# Patient Record
Sex: MALE | Race: WHITE
[De-identification: names, ages, dates, MRNs, and addresses within clinical notes are randomized per-mention and may not be internally consistent; named-entity substitution may affect disease eponyms.]

---

## 2018-08-18 ENCOUNTER — HOSPITAL ENCOUNTER (EMERGENCY)
Dept: HOSPITAL 47 - EC | Age: 33
Discharge: HOME | End: 2018-08-18
Payer: COMMERCIAL

## 2018-08-18 VITALS — TEMPERATURE: 98.4 F | SYSTOLIC BLOOD PRESSURE: 122 MMHG | DIASTOLIC BLOOD PRESSURE: 60 MMHG | RESPIRATION RATE: 18 BRPM

## 2018-08-18 VITALS — HEART RATE: 98 BPM

## 2018-08-18 DIAGNOSIS — F17.200: ICD-10-CM

## 2018-08-18 DIAGNOSIS — L02.414: Primary | ICD-10-CM

## 2018-08-18 PROCEDURE — 99283 EMERGENCY DEPT VISIT LOW MDM: CPT

## 2018-08-18 PROCEDURE — 87070 CULTURE OTHR SPECIMN AEROBIC: CPT

## 2018-08-18 PROCEDURE — 87077 CULTURE AEROBIC IDENTIFY: CPT

## 2018-08-18 PROCEDURE — 87205 SMEAR GRAM STAIN: CPT

## 2018-08-18 PROCEDURE — 10060 I&D ABSCESS SIMPLE/SINGLE: CPT

## 2018-08-18 PROCEDURE — 87186 SC STD MICRODIL/AGAR DIL: CPT

## 2018-08-18 NOTE — ED
General Adult HPI





- General


Chief complaint: Skin/Abscess/Foreign Body


Stated complaint: Arm abscess


Time Seen by Provider: 08/18/18 19:55


Source: patient, RN notes reviewed


Mode of arrival: ambulatory


Limitations: no limitations





- History of Present Illness


Initial comments: 





33-year-old male presents to the emergency determine for a chief complaint of 

abscess on the left upper arm.  Patient states this started 5 days ago.  

Patient states it has gotten better.  He states he thought it would go away so 

did not come until today when he realized it would not go away on its own.  

Patient denies any fevers or chills.  Patient has not tried warm compresses.  

Patient states today it did start draining somewhat.  Patient denies having any 

abscesses previously.  Patient denies any history of MRSA. Patient has no other 

complaints at this time including shortness of breath, chest pain, abdominal 

pain, nausea or vomiting, headache, or visual changes.





- Related Data


 Previous Rx's











 Medication  Instructions  Recorded


 


Cephalexin [Keflex] 500 mg PO Q6H 10 Days  cap 08/18/18


 


Sulfamethox-Tmp 800-160Mg [Bactrim 1 tab PO Q12HR #20 tab 08/18/18





-160 mg]  











 Allergies











Allergy/AdvReac Type Severity Reaction Status Date / Time


 


No Known Allergies Allergy   Verified 08/18/18 19:38














Review of Systems


ROS Statement: 


Those systems with pertinent positive or pertinent negative responses have been 

documented in the HPI.





ROS Other: All systems not noted in ROS Statement are negative.





Past Medical History


Past Medical History: No Reported History


History of Any Multi-Drug Resistant Organisms: None Reported


Past Surgical History: No Surgical Hx Reported


Past Psychological History: No Psychological Hx Reported


Smoking Status: Current every day smoker


Past Alcohol Use History: Occasional


Past Drug Use History: None Reported





General Exam


Limitations: no limitations


General appearance: alert, anxious


Head exam: Present: atraumatic, normocephalic, normal inspection


Eye exam: Present: normal appearance.  Absent: scleral icterus, conjunctival 

injection


ENT exam: Present: normal exam, mucous membranes moist


Neck exam: Present: normal inspection, full ROM.  Absent: tenderness, 

meningismus, lymphadenopathy


Respiratory exam: Present: normal lung sounds bilaterally.  Absent: respiratory 

distress, wheezes, rales, rhonchi, stridor


Cardiovascular Exam: Present: regular rate, normal rhythm, normal heart sounds.

  Absent: systolic murmur, diastolic murmur, rubs, gallop, clicks


Extremities exam: Present: full ROM (Full range of motion of the left arm), 

tenderness (Abscess is very tender to palpation), normal capillary refill (

Capillary refill less than 2 seconds and radial pulse 2+ the left upper 

extremity), other (Patient does have a 6 and a meter by 7 cm abscess noted on 

the left upper arm.  Some evidence of cellulitic changes overlying the abscess 

as it is slightly erythematous.  Abscess already has a small point of drainage.

  There is no streaking redness on the arm.)


Neurological exam: Present: alert, oriented X3, CN II-XII intact


Psychiatric exam: Present: normal affect, normal mood, anxious





Course


 Vital Signs











  08/18/18





  19:34


 


Temperature 98.4 F


 


Pulse Rate 108 H


 


Respiratory 18





Rate 


 


Blood Pressure 122/60


 


O2 Sat by Pulse 98





Oximetry 














Procedures





- Incision & Drainage


Consent Obtained: verbal consent


Time Out Performed?: Yes


Site: upper extremity


Anesthetic Used: lidocaine 1%


Amount (mLs): 1


I&D Cleaning Method: Betadine


Sterile Field Used?: Yes


Scalpel Used: #11


Needle Aspiration Performed?: No


I&D Drainage Obtained: Pus (significant amount), Blood


Culture Obtained?: Yes


Complications: pain


Patient Tolerated Procedure: well





Medical Decision Making





- Medical Decision Making





33-year-old male presents to the emergency department for a chief complaint of 

abscess on the left upper arm.  Patient states it has been there for about 5 

days.  Abscess is about 6 cm x 7 cm.  Possible mild cellulitis overlying the 

abscess.  Incision and drainage was performed and significant amount of 

purulent material was expelled.  Patient does not have any streaking redness.  

No fevers in the emergency department.  It is felt the patient can be treated 

outpatient with antibiotics.  Patient agrees with this.  Patient was given 

starter pack of Keflex and Bactrim.  Aerobic wound culture was sent.  Patient 

will take these antibiotics as well as the prescription for Keflex and Bactrim.

  He will do warm compresses.  He was given the number to dermatology.  Patient 

will return if abscess is growing instead of decreasing in size, he is 

developing fevers, or patient is having any other worsening symptoms such as 

spreading redness or streaking redness.





Disposition


Clinical Impression: 


 Abscess





Disposition: HOME SELF-CARE


Condition: Good


Instructions:  Abscess Incision and Drainage (ED), Abscess (ED)


Additional Instructions: 


Please take antibiotics as directed.  Please use warm compresses multiple times 

a day to try to draw out infection.  Return to the emergency department if you 

have any worsening symptoms including spreading redness streaking redness 

fevers or chills.  If abscess is getting bigger in size and not decreasing 

return to the emergency department as well.


Prescriptions: 


Cephalexin [Keflex] 500 mg PO Q6H 10 Days  cap


Sulfamethox-Tmp 800-160Mg [Bactrim -160 mg] 1 tab PO Q12HR #20 tab


Is patient prescribed a controlled substance at d/c from ED?: No


Referrals: 


Guanako Renteria DO [STAFF PHYSICIAN] - 1-2 days


Yehuda Owen MD [STAFF PHYSICIAN] - 1-2 days


Time of Disposition: 21:19